# Patient Record
Sex: FEMALE | URBAN - METROPOLITAN AREA
[De-identification: names, ages, dates, MRNs, and addresses within clinical notes are randomized per-mention and may not be internally consistent; named-entity substitution may affect disease eponyms.]

---

## 2017-12-04 ENCOUNTER — OUTPATIENT (OUTPATIENT)
Dept: OUTPATIENT SERVICES | Facility: HOSPITAL | Age: 32
LOS: 1 days | End: 2017-12-04
Payer: COMMERCIAL

## 2017-12-04 DIAGNOSIS — Z01.818 ENCOUNTER FOR OTHER PREPROCEDURAL EXAMINATION: ICD-10-CM

## 2017-12-04 LAB
ALBUMIN SERPL ELPH-MCNC: 3.6 G/DL — SIGNIFICANT CHANGE UP (ref 3.3–5)
ALP SERPL-CCNC: 207 U/L — HIGH (ref 40–120)
ALT FLD-CCNC: 6 U/L — LOW (ref 10–45)
ANION GAP SERPL CALC-SCNC: 13 MMOL/L — SIGNIFICANT CHANGE UP (ref 5–17)
APTT BLD: 28 SEC — SIGNIFICANT CHANGE UP (ref 27.5–37.4)
AST SERPL-CCNC: 14 U/L — SIGNIFICANT CHANGE UP (ref 10–40)
BILIRUB SERPL-MCNC: 0.5 MG/DL — SIGNIFICANT CHANGE UP (ref 0.2–1.2)
BLD GP AB SCN SERPL QL: NEGATIVE — SIGNIFICANT CHANGE UP
BLD GP AB SCN SERPL QL: NEGATIVE — SIGNIFICANT CHANGE UP
BUN SERPL-MCNC: 8 MG/DL — SIGNIFICANT CHANGE UP (ref 7–23)
CALCIUM SERPL-MCNC: 9 MG/DL — SIGNIFICANT CHANGE UP (ref 8.4–10.5)
CHLORIDE SERPL-SCNC: 100 MMOL/L — SIGNIFICANT CHANGE UP (ref 96–108)
CO2 SERPL-SCNC: 23 MMOL/L — SIGNIFICANT CHANGE UP (ref 22–31)
CREAT SERPL-MCNC: 0.58 MG/DL — SIGNIFICANT CHANGE UP (ref 0.5–1.3)
GLUCOSE SERPL-MCNC: 70 MG/DL — SIGNIFICANT CHANGE UP (ref 70–99)
HCT VFR BLD CALC: 31.2 % — LOW (ref 34.5–45)
HGB BLD-MCNC: 10 G/DL — LOW (ref 11.5–15.5)
INR BLD: 0.92 — SIGNIFICANT CHANGE UP (ref 0.88–1.16)
MCHC RBC-ENTMCNC: 27.3 PG — SIGNIFICANT CHANGE UP (ref 27–34)
MCHC RBC-ENTMCNC: 32.1 G/DL — SIGNIFICANT CHANGE UP (ref 32–36)
MCV RBC AUTO: 85.2 FL — SIGNIFICANT CHANGE UP (ref 80–100)
PLATELET # BLD AUTO: 227 K/UL — SIGNIFICANT CHANGE UP (ref 150–400)
POTASSIUM SERPL-MCNC: 4.5 MMOL/L — SIGNIFICANT CHANGE UP (ref 3.5–5.3)
POTASSIUM SERPL-SCNC: 4.5 MMOL/L — SIGNIFICANT CHANGE UP (ref 3.5–5.3)
PROT SERPL-MCNC: 6.3 G/DL — SIGNIFICANT CHANGE UP (ref 6–8.3)
PROTHROM AB SERPL-ACNC: 10.2 SEC — SIGNIFICANT CHANGE UP (ref 9.8–12.7)
RBC # BLD: 3.66 M/UL — LOW (ref 3.8–5.2)
RBC # FLD: 14.2 % — SIGNIFICANT CHANGE UP (ref 10.3–16.9)
RH IG SCN BLD-IMP: POSITIVE — SIGNIFICANT CHANGE UP
RH IG SCN BLD-IMP: POSITIVE — SIGNIFICANT CHANGE UP
SODIUM SERPL-SCNC: 136 MMOL/L — SIGNIFICANT CHANGE UP (ref 135–145)
WBC # BLD: 4.6 K/UL — SIGNIFICANT CHANGE UP (ref 3.8–10.5)
WBC # FLD AUTO: 4.6 K/UL — SIGNIFICANT CHANGE UP (ref 3.8–10.5)

## 2017-12-06 ENCOUNTER — INPATIENT (INPATIENT)
Facility: HOSPITAL | Age: 32
LOS: 3 days | Discharge: HOME | End: 2017-12-10
Attending: OBSTETRICS & GYNECOLOGY | Admitting: OBSTETRICS & GYNECOLOGY
Payer: COMMERCIAL

## 2017-12-06 VITALS — WEIGHT: 165.35 LBS | HEIGHT: 61 IN

## 2017-12-06 DIAGNOSIS — D25.9 LEIOMYOMA OF UTERUS, UNSPECIFIED: ICD-10-CM

## 2017-12-06 DIAGNOSIS — Z34.83 ENCOUNTER FOR SUPERVISION OF OTHER NORMAL PREGNANCY, THIRD TRIMESTER: ICD-10-CM

## 2017-12-06 DIAGNOSIS — Z87.42 PERSONAL HISTORY OF OTHER DISEASES OF THE FEMALE GENITAL TRACT: ICD-10-CM

## 2017-12-06 DIAGNOSIS — Z3A.39 39 WEEKS GESTATION OF PREGNANCY: ICD-10-CM

## 2017-12-06 DIAGNOSIS — K66.0 PERITONEAL ADHESIONS (POSTPROCEDURAL) (POSTINFECTION): ICD-10-CM

## 2017-12-06 DIAGNOSIS — O34.211 MATERNAL CARE FOR LOW TRANSVERSE SCAR FROM PREVIOUS CESAREAN DELIVERY: ICD-10-CM

## 2017-12-06 DIAGNOSIS — O34.593 MATERNAL CARE FOR OTHER ABNORMALITIES OF GRAVID UTERUS, THIRD TRIMESTER: ICD-10-CM

## 2017-12-06 DIAGNOSIS — Z91.040 LATEX ALLERGY STATUS: ICD-10-CM

## 2017-12-06 DIAGNOSIS — O34.13 MATERNAL CARE FOR BENIGN TUMOR OF CORPUS UTERI, THIRD TRIMESTER: ICD-10-CM

## 2017-12-06 LAB — T PALLIDUM AB TITR SER: NEGATIVE — SIGNIFICANT CHANGE UP

## 2017-12-06 RX ORDER — SIMETHICONE 80 MG/1
80 TABLET, CHEWABLE ORAL EVERY 4 HOURS
Qty: 0 | Refills: 0 | Status: DISCONTINUED | OUTPATIENT
Start: 2017-12-06 | End: 2017-12-10

## 2017-12-06 RX ORDER — SODIUM CHLORIDE 9 MG/ML
1000 INJECTION, SOLUTION INTRAVENOUS
Qty: 0 | Refills: 0 | Status: DISCONTINUED | OUTPATIENT
Start: 2017-12-06 | End: 2017-12-06

## 2017-12-06 RX ORDER — HEPARIN SODIUM 5000 [USP'U]/ML
5000 INJECTION INTRAVENOUS; SUBCUTANEOUS EVERY 12 HOURS
Qty: 0 | Refills: 0 | Status: DISCONTINUED | OUTPATIENT
Start: 2017-12-06 | End: 2017-12-10

## 2017-12-06 RX ORDER — METOCLOPRAMIDE HCL 10 MG
10 TABLET ORAL ONCE
Qty: 0 | Refills: 0 | Status: DISCONTINUED | OUTPATIENT
Start: 2017-12-06 | End: 2017-12-10

## 2017-12-06 RX ORDER — ONDANSETRON 8 MG/1
4 TABLET, FILM COATED ORAL EVERY 6 HOURS
Qty: 0 | Refills: 0 | Status: DISCONTINUED | OUTPATIENT
Start: 2017-12-06 | End: 2017-12-10

## 2017-12-06 RX ORDER — OXYCODONE AND ACETAMINOPHEN 5; 325 MG/1; MG/1
1 TABLET ORAL
Qty: 0 | Refills: 0 | Status: DISCONTINUED | OUTPATIENT
Start: 2017-12-06 | End: 2017-12-10

## 2017-12-06 RX ORDER — IBUPROFEN 200 MG
600 TABLET ORAL EVERY 6 HOURS
Qty: 0 | Refills: 0 | Status: DISCONTINUED | OUTPATIENT
Start: 2017-12-06 | End: 2017-12-10

## 2017-12-06 RX ORDER — LANOLIN
1 OINTMENT (GRAM) TOPICAL
Qty: 0 | Refills: 0 | Status: DISCONTINUED | OUTPATIENT
Start: 2017-12-06 | End: 2017-12-10

## 2017-12-06 RX ORDER — DOCUSATE SODIUM 100 MG
100 CAPSULE ORAL
Qty: 0 | Refills: 0 | Status: DISCONTINUED | OUTPATIENT
Start: 2017-12-06 | End: 2017-12-10

## 2017-12-06 RX ORDER — INFLUENZA VIRUS VACCINE 15; 15; 15; 15 UG/.5ML; UG/.5ML; UG/.5ML; UG/.5ML
0.5 SUSPENSION INTRAMUSCULAR ONCE
Qty: 0 | Refills: 0 | Status: COMPLETED | OUTPATIENT
Start: 2017-12-06 | End: 2017-12-06

## 2017-12-06 RX ORDER — GLYCERIN ADULT
1 SUPPOSITORY, RECTAL RECTAL AT BEDTIME
Qty: 0 | Refills: 0 | Status: DISCONTINUED | OUTPATIENT
Start: 2017-12-06 | End: 2017-12-10

## 2017-12-06 RX ORDER — NALOXONE HYDROCHLORIDE 4 MG/.1ML
0.1 SPRAY NASAL
Qty: 0 | Refills: 0 | Status: DISCONTINUED | OUTPATIENT
Start: 2017-12-06 | End: 2017-12-10

## 2017-12-06 RX ORDER — OXYTOCIN 10 UNIT/ML
41.67 VIAL (ML) INJECTION
Qty: 20 | Refills: 0 | Status: DISCONTINUED | OUTPATIENT
Start: 2017-12-06 | End: 2017-12-06

## 2017-12-06 RX ORDER — SODIUM CHLORIDE 9 MG/ML
1000 INJECTION, SOLUTION INTRAVENOUS
Qty: 0 | Refills: 0 | Status: DISCONTINUED | OUTPATIENT
Start: 2017-12-06 | End: 2017-12-10

## 2017-12-06 RX ORDER — SODIUM CHLORIDE 9 MG/ML
1000 INJECTION, SOLUTION INTRAVENOUS ONCE
Qty: 0 | Refills: 0 | Status: COMPLETED | OUTPATIENT
Start: 2017-12-06 | End: 2017-12-06

## 2017-12-06 RX ORDER — DIPHENHYDRAMINE HCL 50 MG
25 CAPSULE ORAL EVERY 6 HOURS
Qty: 0 | Refills: 0 | Status: DISCONTINUED | OUTPATIENT
Start: 2017-12-06 | End: 2017-12-10

## 2017-12-06 RX ORDER — FERROUS SULFATE 325(65) MG
325 TABLET ORAL DAILY
Qty: 0 | Refills: 0 | Status: DISCONTINUED | OUTPATIENT
Start: 2017-12-06 | End: 2017-12-10

## 2017-12-06 RX ORDER — OXYTOCIN 10 UNIT/ML
333.33 VIAL (ML) INJECTION
Qty: 20 | Refills: 0 | Status: COMPLETED | OUTPATIENT
Start: 2017-12-06 | End: 2017-12-06

## 2017-12-06 RX ORDER — OXYCODONE AND ACETAMINOPHEN 5; 325 MG/1; MG/1
2 TABLET ORAL EVERY 6 HOURS
Qty: 0 | Refills: 0 | Status: DISCONTINUED | OUTPATIENT
Start: 2017-12-06 | End: 2017-12-10

## 2017-12-06 RX ORDER — OXYTOCIN 10 UNIT/ML
41.67 VIAL (ML) INJECTION
Qty: 20 | Refills: 0 | Status: DISCONTINUED | OUTPATIENT
Start: 2017-12-06 | End: 2017-12-10

## 2017-12-06 RX ORDER — CEFAZOLIN SODIUM 1 G
2000 VIAL (EA) INJECTION EVERY 8 HOURS
Qty: 0 | Refills: 0 | Status: COMPLETED | OUTPATIENT
Start: 2017-12-06 | End: 2017-12-07

## 2017-12-06 RX ORDER — ACETAMINOPHEN 500 MG
650 TABLET ORAL EVERY 6 HOURS
Qty: 0 | Refills: 0 | Status: DISCONTINUED | OUTPATIENT
Start: 2017-12-06 | End: 2017-12-10

## 2017-12-06 RX ORDER — TETANUS TOXOID, REDUCED DIPHTHERIA TOXOID AND ACELLULAR PERTUSSIS VACCINE, ADSORBED 5; 2.5; 8; 8; 2.5 [IU]/.5ML; [IU]/.5ML; UG/.5ML; UG/.5ML; UG/.5ML
0.5 SUSPENSION INTRAMUSCULAR ONCE
Qty: 0 | Refills: 0 | Status: DISCONTINUED | OUTPATIENT
Start: 2017-12-06 | End: 2017-12-10

## 2017-12-06 RX ORDER — CITRIC ACID/SODIUM CITRATE 300-500 MG
30 SOLUTION, ORAL ORAL ONCE
Qty: 0 | Refills: 0 | Status: COMPLETED | OUTPATIENT
Start: 2017-12-06 | End: 2017-12-06

## 2017-12-06 RX ORDER — CEFAZOLIN SODIUM 1 G
2000 VIAL (EA) INJECTION ONCE
Qty: 0 | Refills: 0 | Status: COMPLETED | OUTPATIENT
Start: 2017-12-06 | End: 2017-12-06

## 2017-12-06 RX ADMIN — Medication 100 MILLIGRAM(S): at 18:49

## 2017-12-06 RX ADMIN — SODIUM CHLORIDE 125 MILLILITER(S): 9 INJECTION, SOLUTION INTRAVENOUS at 18:49

## 2017-12-06 RX ADMIN — Medication 125 MILLIUNIT(S)/MIN: at 14:12

## 2017-12-06 RX ADMIN — SODIUM CHLORIDE 2000 MILLILITER(S): 9 INJECTION, SOLUTION INTRAVENOUS at 08:15

## 2017-12-06 RX ADMIN — Medication 30 MILLILITER(S): at 09:49

## 2017-12-06 RX ADMIN — HEPARIN SODIUM 5000 UNIT(S): 5000 INJECTION INTRAVENOUS; SUBCUTANEOUS at 22:11

## 2017-12-06 RX ADMIN — Medication 1000 MILLIUNIT(S)/MIN: at 14:12

## 2017-12-06 RX ADMIN — Medication 600 MILLIGRAM(S): at 22:41

## 2017-12-06 RX ADMIN — Medication 100 MILLIGRAM(S): at 18:48

## 2017-12-06 RX ADMIN — Medication 100 MILLIGRAM(S): at 09:49

## 2017-12-06 RX ADMIN — SODIUM CHLORIDE 125 MILLILITER(S): 9 INJECTION, SOLUTION INTRAVENOUS at 09:14

## 2017-12-06 RX ADMIN — SIMETHICONE 80 MILLIGRAM(S): 80 TABLET, CHEWABLE ORAL at 22:11

## 2017-12-06 RX ADMIN — Medication 600 MILLIGRAM(S): at 22:11

## 2017-12-06 RX ADMIN — Medication 125 MILLIUNIT(S)/MIN: at 14:13

## 2017-12-07 LAB
HCT VFR BLD CALC: 27.1 % — LOW (ref 34.5–45)
HGB BLD-MCNC: 8.4 G/DL — LOW (ref 11.5–15.5)
MCHC RBC-ENTMCNC: 27.1 PG — SIGNIFICANT CHANGE UP (ref 27–34)
MCHC RBC-ENTMCNC: 31 G/DL — LOW (ref 32–36)
MCV RBC AUTO: 87.4 FL — SIGNIFICANT CHANGE UP (ref 80–100)
PLATELET # BLD AUTO: 223 K/UL — SIGNIFICANT CHANGE UP (ref 150–400)
RBC # BLD: 3.1 M/UL — LOW (ref 3.8–5.2)
RBC # FLD: 14 % — SIGNIFICANT CHANGE UP (ref 10.3–16.9)
WBC # BLD: 9.5 K/UL — SIGNIFICANT CHANGE UP (ref 3.8–10.5)
WBC # FLD AUTO: 9.5 K/UL — SIGNIFICANT CHANGE UP (ref 3.8–10.5)

## 2017-12-07 RX ORDER — TETANUS TOXOID, REDUCED DIPHTHERIA TOXOID AND ACELLULAR PERTUSSIS VACCINE, ADSORBED 5; 2.5; 8; 8; 2.5 [IU]/.5ML; [IU]/.5ML; UG/.5ML; UG/.5ML; UG/.5ML
0.5 SUSPENSION INTRAMUSCULAR ONCE
Qty: 0 | Refills: 0 | Status: COMPLETED | OUTPATIENT
Start: 2017-12-07 | End: 2017-12-09

## 2017-12-07 RX ADMIN — OXYCODONE AND ACETAMINOPHEN 1 TABLET(S): 5; 325 TABLET ORAL at 12:00

## 2017-12-07 RX ADMIN — Medication 600 MILLIGRAM(S): at 04:16

## 2017-12-07 RX ADMIN — SIMETHICONE 80 MILLIGRAM(S): 80 TABLET, CHEWABLE ORAL at 12:26

## 2017-12-07 RX ADMIN — Medication 325 MILLIGRAM(S): at 12:26

## 2017-12-07 RX ADMIN — OXYCODONE AND ACETAMINOPHEN 1 TABLET(S): 5; 325 TABLET ORAL at 12:45

## 2017-12-07 RX ADMIN — Medication 600 MILLIGRAM(S): at 04:50

## 2017-12-07 RX ADMIN — Medication 600 MILLIGRAM(S): at 12:45

## 2017-12-07 RX ADMIN — Medication 600 MILLIGRAM(S): at 12:00

## 2017-12-07 RX ADMIN — Medication 100 MILLIGRAM(S): at 02:02

## 2017-12-07 RX ADMIN — Medication 600 MILLIGRAM(S): at 18:53

## 2017-12-07 RX ADMIN — OXYCODONE AND ACETAMINOPHEN 1 TABLET(S): 5; 325 TABLET ORAL at 16:33

## 2017-12-07 RX ADMIN — OXYCODONE AND ACETAMINOPHEN 1 TABLET(S): 5; 325 TABLET ORAL at 17:00

## 2017-12-07 RX ADMIN — Medication 100 MILLIGRAM(S): at 18:54

## 2017-12-07 RX ADMIN — Medication 100 MILLIGRAM(S): at 05:45

## 2017-12-07 RX ADMIN — Medication 100 MILLIGRAM(S): at 12:04

## 2017-12-07 RX ADMIN — HEPARIN SODIUM 5000 UNIT(S): 5000 INJECTION INTRAVENOUS; SUBCUTANEOUS at 22:50

## 2017-12-07 RX ADMIN — Medication 1 TABLET(S): at 12:01

## 2017-12-07 RX ADMIN — HEPARIN SODIUM 5000 UNIT(S): 5000 INJECTION INTRAVENOUS; SUBCUTANEOUS at 12:04

## 2017-12-07 NOTE — LACTATION INITIAL EVALUATION - LATCH
Did not observe latch as baby had just been formula fed and was sleeping soundly in BS crib at this time

## 2017-12-07 NOTE — LACTATION INITIAL EVALUATION - POTENTIAL FOR
ineffective breastfeeding/engorgement/wound healing/feeding confusion/sore nipples/knowledge deficit

## 2017-12-07 NOTE — LACTATION INITIAL EVALUATION - PRO FEEDING CONCERNS YN OB
yes/Inverted/dimpled R nipple that does not leroy at all with stimulation and mom reports discomfort if she tries to leroy that nipple. L nipple is cracked and sore. Mom states that she is not sure she has a desire to breastfeed.

## 2017-12-07 NOTE — LACTATION INITIAL EVALUATION - NIPPLE ASSESSMENT (RIGHT)
Does not leroy with stimulation or a hand pump/dimpled/inverted/not compressible/small/dry and intact

## 2017-12-07 NOTE — PROGRESS NOTE ADULT - SUBJECTIVE AND OBJECTIVE BOX
POD #1  No complaints.  Afegbilre, VS normal.  + flatus.  Abdomen and wound benign.  Walter draining clear urine, to be removed this am.  REGIS Juarez M.D.

## 2017-12-07 NOTE — LACTATION INITIAL EVALUATION - PRO NIPPLE PREFERENCE OB
preference/Slow flow nipples should be used for any supplementation, including formula, fed to baby via a bottle

## 2017-12-07 NOTE — LACTATION INITIAL EVALUATION - NS LACT CON REASON FOR REQ
pump request/Met Dyad. Pt states "I am not sure if I am going to breastfeed. I struggled with my first child and just formula fed him. I might just formula feed this baby as well". Pt has an inverted/dimpled R nipple that does not leroy with stimulation. She has a hand pump at  and reports that "when I try to pull that nipple out, it hurts". Her L nipple is everted well, but cracked/scabbed at the base of the nipple shaft, probably d/t a shallow latch of baby. Reviewed good skin hygiene and maintenance b/w feeding. Breast shells given as well and encouraged mom to wear the shell for inverted nipples on her R breast b/w feeds. Discussed with pt that it is her choice if she wants to breastfeed or not and only she can make that decision. Baby has been formula fed x3 since birth: 30,20,20 cc's, respectively. Baby born  @ 1035, R C/S,  now, 39.1 wks. x2 voids/x3 poops since birth (breast and formula). Expressible colostrum noted on mom's L breast, no expressible colostrum noted on R side with inverted nipple. Discussed with pt that if she does desire to attempt to BF and since formula has been given, she should start the triple feed plan for effective stimulation and to provide EBM to baby, if pt desires. Also reviewed appropriate supplementation amounts per triple feed plan. BS RN to bring pt a BS pump. Discussed that triple feed plan is a lot of work and if she feels she wants to solely formula feed, then she should not overstimulate her breasts by pumping and bringing in mature milk, as then she would have to wean and may deal with unnecessary engorgement. Pt states "I will try to pump and then I will think about it". Reviewed bf'ing basics; information provided on sore nipple management and paced bottle feeding for supplementation. Many visitors at  at this time and pt appeared very distracted. Slow flow nipples provided to use for any supplementation fed to baby via bottle./inverted/flat nipples/multiparous mom

## 2017-12-07 NOTE — LACTATION INITIAL EVALUATION - PRO FEEDING PLAN INFANT OB
breast and formula  feeding…/Baby has been mostly formula fed since birth. Explained triple feed plan if Mom decides she wants to breastfeed.

## 2017-12-07 NOTE — PROGRESS NOTE ADULT - ASSESSMENT
A/P 32y  s/p c/s, POD #1  ,stable  - f/u AM CBC  Diet: Reg  Pain: OPM  IV fluid: Saline lock  OOB/SCDs/IS  Continue to monitor  Anticipate discharge POD #3 or #4

## 2017-12-07 NOTE — PROGRESS NOTE ADULT - SUBJECTIVE AND OBJECTIVE BOX
Patient evaluated at bedside.   She reports pain is well controlled with OPM  She denies headache, dizziness, chest pain, palpitations, shortness of breathe, nausea, vomiting or heavy vaginal bleeding.  She has not been ambulating  or voiding spontaneously with richey in. She is passing gas, tolerating regular diet and is breastfeeding.    Physical Exam:  Vital Signs Last 24 Hrs  T(C): 36.6 (07 Dec 2017 05:22), Max: 36.8 (06 Dec 2017 11:45)  T(F): 97.9 (07 Dec 2017 05:22), Max: 98.3 (06 Dec 2017 22:12)  HR: 88 (07 Dec 2017 05:22) (64 - 97)  BP: 83/54 (07 Dec 2017 05:22) (82/52 - 109/62)  BP(mean): --  RR: 20 (07 Dec 2017 05:22) (16 - 20)  SpO2: 97% (07 Dec 2017 05:22) (96% - 99%)    GA: NAD, A+0 x 3  CV: RRR  Pulm: CTAB  Breasts: soft, nontender, no palpable masses  Abd: + BS, soft, nontender, nondistended, no rebound or guarding, incision clean, dry and intact, uterus firm at midline, 2 fb below umbilicus  : lochia WNL  Richey: clear urine  Extremities: no swelling or calf tenderness, reflexes +2 bilaterally

## 2017-12-07 NOTE — LACTATION INITIAL EVALUATION - LACTATION INTERVENTIONS
Triple feed plan information provided if pt decides to pursue breastfeeding. At this time she is stating that she may formula feed./initiate skin to skin/stimulate nutritive suck using/initiate hand expression routine/initiate dual electric pump routine

## 2017-12-08 RX ADMIN — OXYCODONE AND ACETAMINOPHEN 1 TABLET(S): 5; 325 TABLET ORAL at 11:40

## 2017-12-08 RX ADMIN — HEPARIN SODIUM 5000 UNIT(S): 5000 INJECTION INTRAVENOUS; SUBCUTANEOUS at 23:14

## 2017-12-08 RX ADMIN — Medication 600 MILLIGRAM(S): at 10:59

## 2017-12-08 RX ADMIN — Medication 100 MILLIGRAM(S): at 10:56

## 2017-12-08 RX ADMIN — Medication 600 MILLIGRAM(S): at 18:16

## 2017-12-08 RX ADMIN — OXYCODONE AND ACETAMINOPHEN 1 TABLET(S): 5; 325 TABLET ORAL at 06:53

## 2017-12-08 RX ADMIN — Medication 600 MILLIGRAM(S): at 00:57

## 2017-12-08 RX ADMIN — Medication 600 MILLIGRAM(S): at 11:40

## 2017-12-08 RX ADMIN — Medication 600 MILLIGRAM(S): at 01:28

## 2017-12-08 RX ADMIN — Medication 1 TABLET(S): at 10:57

## 2017-12-08 RX ADMIN — Medication 100 MILLIGRAM(S): at 17:33

## 2017-12-08 RX ADMIN — Medication 100 MILLIGRAM(S): at 06:21

## 2017-12-08 RX ADMIN — Medication 325 MILLIGRAM(S): at 10:57

## 2017-12-08 RX ADMIN — HEPARIN SODIUM 5000 UNIT(S): 5000 INJECTION INTRAVENOUS; SUBCUTANEOUS at 10:56

## 2017-12-08 RX ADMIN — OXYCODONE AND ACETAMINOPHEN 1 TABLET(S): 5; 325 TABLET ORAL at 10:56

## 2017-12-08 RX ADMIN — OXYCODONE AND ACETAMINOPHEN 1 TABLET(S): 5; 325 TABLET ORAL at 06:21

## 2017-12-08 RX ADMIN — Medication 600 MILLIGRAM(S): at 17:33

## 2017-12-08 RX ADMIN — Medication 600 MILLIGRAM(S): at 23:30

## 2017-12-08 NOTE — PROGRESS NOTE ADULT - SUBJECTIVE AND OBJECTIVE BOX
POD #2  No complaints.  Afebrile, VS stable.  Abdomen and wound benign.  Voiding w/o problem.  Pt. is stable.  Routine p-o care.  REGIS Juarez M.D.

## 2017-12-09 ENCOUNTER — TRANSCRIPTION ENCOUNTER (OUTPATIENT)
Age: 32
End: 2017-12-09

## 2017-12-09 RX ORDER — ACETAMINOPHEN 500 MG
2 TABLET ORAL
Qty: 0 | Refills: 0 | COMMUNITY
Start: 2017-12-09

## 2017-12-09 RX ORDER — IBUPROFEN 200 MG
1 TABLET ORAL
Qty: 0 | Refills: 0 | COMMUNITY
Start: 2017-12-09

## 2017-12-09 RX ORDER — LANOLIN
1 OINTMENT (GRAM) TOPICAL
Qty: 0 | Refills: 0 | COMMUNITY
Start: 2017-12-09

## 2017-12-09 RX ORDER — ACETAMINOPHEN 500 MG
650 TABLET ORAL EVERY 6 HOURS
Qty: 0 | Refills: 0 | Status: DISCONTINUED | OUTPATIENT
Start: 2017-12-09 | End: 2017-12-10

## 2017-12-09 RX ADMIN — Medication 650 MILLIGRAM(S): at 13:20

## 2017-12-09 RX ADMIN — Medication 650 MILLIGRAM(S): at 21:57

## 2017-12-09 RX ADMIN — Medication 600 MILLIGRAM(S): at 05:30

## 2017-12-09 RX ADMIN — Medication 650 MILLIGRAM(S): at 06:25

## 2017-12-09 RX ADMIN — HEPARIN SODIUM 5000 UNIT(S): 5000 INJECTION INTRAVENOUS; SUBCUTANEOUS at 11:43

## 2017-12-09 RX ADMIN — Medication 325 MILLIGRAM(S): at 11:43

## 2017-12-09 RX ADMIN — Medication 100 MILLIGRAM(S): at 06:25

## 2017-12-09 RX ADMIN — Medication 600 MILLIGRAM(S): at 23:37

## 2017-12-09 RX ADMIN — Medication 600 MILLIGRAM(S): at 00:20

## 2017-12-09 RX ADMIN — Medication 650 MILLIGRAM(S): at 12:42

## 2017-12-09 RX ADMIN — Medication 600 MILLIGRAM(S): at 06:27

## 2017-12-09 RX ADMIN — TETANUS TOXOID, REDUCED DIPHTHERIA TOXOID AND ACELLULAR PERTUSSIS VACCINE, ADSORBED 0.5 MILLILITER(S): 5; 2.5; 8; 8; 2.5 SUSPENSION INTRAMUSCULAR at 11:43

## 2017-12-09 RX ADMIN — HEPARIN SODIUM 5000 UNIT(S): 5000 INJECTION INTRAVENOUS; SUBCUTANEOUS at 22:40

## 2017-12-09 RX ADMIN — Medication 600 MILLIGRAM(S): at 16:53

## 2017-12-09 RX ADMIN — Medication 1 TABLET(S): at 11:43

## 2017-12-09 RX ADMIN — Medication 650 MILLIGRAM(S): at 21:00

## 2017-12-09 RX ADMIN — Medication 600 MILLIGRAM(S): at 22:37

## 2017-12-09 RX ADMIN — Medication 600 MILLIGRAM(S): at 17:40

## 2017-12-09 NOTE — PROGRESS NOTE ADULT - SUBJECTIVE AND OBJECTIVE BOX
Patient evaluated at bedside.   She reports pain is well controlled with OPM  She denies headache, dizziness, chest pain, palpitations, shortness of breathe, nausea, vomiting or heavy vaginal bleeding.  She has been ambulating  and voiding spontaneously. She is passing gas, tolerating regular diet and is breastfeeding.    Physical Exam:  Vital Signs Last 24 Hrs  T(C): 36.8 (08 Dec 2017 18:58), Max: 36.9 (08 Dec 2017 06:17)  T(F): 98.3 (08 Dec 2017 18:58), Max: 98.4 (08 Dec 2017 06:17)  HR: 86 (08 Dec 2017 18:58) (86 - 98)  BP: 95/64 (08 Dec 2017 18:58) (91/59 - 95/64)  BP(mean): --  RR: 18 (08 Dec 2017 18:58) (16 - 18)  SpO2: 99% (08 Dec 2017 18:58) (99% - 99%)    GA: NAD, A+0 x 3  CV: RRR  Pulm: CTAB  Breasts: soft, nontender, no palpable masses  Abd: + BS, soft, nontender, nondistended, no rebound or guarding, incision clean, dry and intact, uterus firm at midline, 2 fb below umbilicus  : lochia WNL  Walter: clear urine  Extremities: no swelling or calf tenderness, reflexes +2 bilaterally

## 2017-12-09 NOTE — DISCHARGE NOTE OB - MEDICATION SUMMARY - MEDICATIONS TO TAKE
I will START or STAY ON the medications listed below when I get home from the hospital:    acetaminophen 325 mg oral tablet  -- 2 tab(s) by mouth every 6 hours, As needed, For Temp greater than 38.5 C (101.3 F)  -- Indication: For PREGNANCY    ibuprofen 600 mg oral tablet  -- 1 tab(s) by mouth every 6 hours, As needed, Mild pain or headache  -- Indication: For PREGNANCY    lanolin topical ointment  -- 1 application on skin every 3 hours, As needed, Sore Nipples  -- Indication: For PREGNANCY    Prenatal Multivitamins with Folic Acid 1 mg oral tablet  -- 1 tab(s) by mouth once a day  -- Indication: For PREGNANCY

## 2017-12-09 NOTE — DISCHARGE NOTE OB - CARE PROVIDER_API CALL
Norman Juarez (MD), Obstetrics and Gynecology  [address] [place] [address]  Phone: [phone]  Fax: [phone]

## 2017-12-09 NOTE — DISCHARGE NOTE OB - CARE PLAN
Principal Discharge DX:	39 weeks gestation of pregnancy  Goal:	independent living  Instructions for follow-up, activity and diet:	Appointment with Dr. carrillo in 2 weeks, regular diet, normal activity  Secondary Diagnosis:	Uterine scar from previous  delivery

## 2017-12-09 NOTE — PROGRESS NOTE ADULT - SUBJECTIVE AND OBJECTIVE BOX
POD #3  c/o post-op pain that is being controlled with Tylenol and Motrin  Afebrile, VS stable.  Abdomen and wound benign  Pt. stable.  For d/c home tomorrow am.  REGIS Juarez M.D.

## 2017-12-09 NOTE — DISCHARGE NOTE OB - PATIENT PORTAL LINK FT
“You can access the [employer] Patient Portal, offered by [facility], by registering with the following website: [email or web address]”

## 2017-12-09 NOTE — PROGRESS NOTE ADULT - ASSESSMENT
A/P 32y  s/p c/s, POD #3  ,stable  Diet: Reg  Pain: OPM  IV fluid: Saline lock  OOB/SCDs/IS  Continue to monitor  Anticipate discharge POD #3 or #4

## 2017-12-10 VITALS
RESPIRATION RATE: 18 BRPM | DIASTOLIC BLOOD PRESSURE: 72 MMHG | SYSTOLIC BLOOD PRESSURE: 102 MMHG | TEMPERATURE: 98 F | OXYGEN SATURATION: 100 % | HEART RATE: 87 BPM

## 2017-12-10 RX ADMIN — Medication 650 MILLIGRAM(S): at 10:30

## 2017-12-10 RX ADMIN — Medication 325 MILLIGRAM(S): at 10:02

## 2017-12-10 RX ADMIN — Medication 1 TABLET(S): at 10:02

## 2017-12-10 RX ADMIN — Medication 600 MILLIGRAM(S): at 06:53

## 2017-12-10 RX ADMIN — Medication 100 MILLIGRAM(S): at 06:52

## 2017-12-10 RX ADMIN — Medication 650 MILLIGRAM(S): at 10:02

## 2017-12-10 RX ADMIN — Medication 600 MILLIGRAM(S): at 07:50

## 2017-12-10 NOTE — PROGRESS NOTE ADULT - ASSESSMENT
A/P  32y  s/p , POD #4, stable  - Pain: Motrin or Percocet prn  - GI: Reg diet  - : Voiding  - DVT prophylaxis: SQH 5000u q12  - Dispo: POD4

## 2017-12-10 NOTE — PROGRESS NOTE ADULT - SUBJECTIVE AND OBJECTIVE BOX
POD #4  No complaints except having a hard time having BM.  Afebrile, VS normal  Abdomen benign, wound clean.  Staples to be removed prior to discharge.  Received TDAP yesterday.  Pt. stable and ready for d/c home.   Instructions given.  Chapis HAYWARD M.D.

## 2017-12-10 NOTE — PROGRESS NOTE ADULT - SUBJECTIVE AND OBJECTIVE BOX
Patient evaluated at bedside.   She reports pain is well controlled.  She denies headache, dizziness, chest pain, palpitations, shortness of breathe, nausea, vomiting or heavy vaginal bleeding.  She has been ambulating without assistance, voiding spontaneously, passing gas, tolerating regular diet and is not breastfeeding.    Physical Exam:  Vital Signs Last 24 Hrs  T(C): 37.1 (10 Dec 2017 06:06), Max: 37.1 (10 Dec 2017 06:06)  T(F): 98.8 (10 Dec 2017 06:06), Max: 98.8 (10 Dec 2017 06:06)  HR: 92 (10 Dec 2017 06:06) (70 - 98)  BP: 101/64 (10 Dec 2017 06:06) (101/64 - 111/75)  BP(mean): --  RR: 18 (10 Dec 2017 06:06) (18 - 18)  SpO2: 98% (10 Dec 2017 06:06) (98% - 99%)      GA: NAD, A+0 x 3  CV: RRR  Pulm: Normal work of breathing  Breasts: soft, nontender, no palpable masses  Abd: + BS, soft, nontender, nondistended, no rebound or guarding, uterus firm at midline, at the umbilicus  Incision: well approximated, no erythema or discharge  : lochia WNL  Extremities: no swelling or calf tenderness

## 2017-12-12 LAB — SURGICAL PATHOLOGY STUDY: SIGNIFICANT CHANGE UP

## 2023-05-10 ENCOUNTER — APPOINTMENT (OUTPATIENT)
Dept: OBGYN | Facility: CLINIC | Age: 38
End: 2023-05-10
Payer: COMMERCIAL

## 2023-05-10 VITALS
SYSTOLIC BLOOD PRESSURE: 99 MMHG | OXYGEN SATURATION: 94 % | HEIGHT: 61 IN | HEART RATE: 92 BPM | BODY MASS INDEX: 26.24 KG/M2 | DIASTOLIC BLOOD PRESSURE: 71 MMHG | WEIGHT: 139 LBS

## 2023-05-10 DIAGNOSIS — O36.80X0 PREGNANCY WITH INCONCLUSIVE FETAL VIABILITY, NOT APPLICABLE OR UNSPECIFIED: ICD-10-CM

## 2023-05-10 DIAGNOSIS — L20.9 ATOPIC DERMATITIS, UNSPECIFIED: ICD-10-CM

## 2023-05-10 DIAGNOSIS — Z32.01 ENCOUNTER FOR PREGNANCY TEST, RESULT POSITIVE: ICD-10-CM

## 2023-05-10 PROBLEM — Z00.00 ENCOUNTER FOR PREVENTIVE HEALTH EXAMINATION: Status: ACTIVE | Noted: 2023-05-10

## 2023-05-10 PROCEDURE — 76830 TRANSVAGINAL US NON-OB: CPT

## 2023-05-10 PROCEDURE — 99203 OFFICE O/P NEW LOW 30 MIN: CPT | Mod: 25

## 2023-05-10 PROCEDURE — 36415 COLL VENOUS BLD VENIPUNCTURE: CPT

## 2023-05-10 NOTE — PLAN
[FreeTextEntry1] : Thin EMT, no gestational sac, ISRAEL hypoechoic region, poss nabothian cyst. Discussed DDx with pt: possible miscarriage vs early gestation vs ectopic pregnancy. RTO in 48 hours for repeat bHCG and reassessment of symptoms.

## 2023-05-10 NOTE — PROCEDURE
[Transvaginal OB Sonogram] : Transvaginal OB Sonogram [Intrauterine Pregnancy] : no intrauterine pregnancy [Yolk Sac] : no yolk sac [FreeTextEntry1] : Thin EMT, no gestational sac, ISRAEL hypoechoic region, poss nabothian cyst

## 2023-05-10 NOTE — HISTORY OF PRESENT ILLNESS
[HCG+: ___(Date)] : a positive HCG was recorded on [unfilled] [Currently Active] : currently active [Men] : men [No] : No [Y] : Positive pregnancy history [PGHxTotal] : 4 [[facility] : 2 [PGHxAbortions] : 1 [[facility]] : 2 [PGHxABInduced] : 1 [FreeTextEntry1] : 03/24/2023

## 2023-05-11 LAB
ABO + RH PNL BLD: NORMAL
BASOPHILS # BLD AUTO: 0.02 K/UL
BASOPHILS NFR BLD AUTO: 0.4 %
BLD GP AB SCN SERPL QL: NORMAL
EOSINOPHIL # BLD AUTO: 0.1 K/UL
EOSINOPHIL NFR BLD AUTO: 2.1 %
HCG SERPL-MCNC: 38 MIU/ML
HCT VFR BLD CALC: 41.4 %
HGB BLD-MCNC: 13.1 G/DL
IMM GRANULOCYTES NFR BLD AUTO: 0.2 %
LYMPHOCYTES # BLD AUTO: 1.75 K/UL
LYMPHOCYTES NFR BLD AUTO: 37.4 %
MAN DIFF?: NORMAL
MCHC RBC-ENTMCNC: 30.8 PG
MCHC RBC-ENTMCNC: 31.6 GM/DL
MCV RBC AUTO: 97.2 FL
MONOCYTES # BLD AUTO: 0.33 K/UL
MONOCYTES NFR BLD AUTO: 7.1 %
NEUTROPHILS # BLD AUTO: 2.47 K/UL
NEUTROPHILS NFR BLD AUTO: 52.8 %
PLATELET # BLD AUTO: 309 K/UL
PROGEST SERPL-MCNC: 0.2 NG/ML
RBC # BLD: 4.26 M/UL
RBC # FLD: 12.9 %
WBC # FLD AUTO: 4.68 K/UL

## 2023-05-12 ENCOUNTER — APPOINTMENT (OUTPATIENT)
Dept: OBGYN | Facility: CLINIC | Age: 38
End: 2023-05-12

## 2024-08-15 NOTE — DISCHARGE NOTE OB - NS AS DC FOLLOWUP STROKE INST
Received patient at 1930.  Alert and oriented x 4. Tele Rhythm A.fib, oxygen maintained on room air. Breath sounds -diminished. Skin shows old bruising. Last bowel movement 07/10-according to Pt, provided laxative. Urostomy site clean and intact. Patient reports No chest pain. There is shortness of breath on exertion. Bed is locked and in low position. Call light and personal items within reach. Pt up stand by with walker. Reviewed plan of care and patient verbalizes understanding.         Problem: PAIN - ADULT  Goal: Verbalizes/displays adequate comfort level or patient's stated pain goal  Description: INTERVENTIONS:  - Encourage pt to monitor pain and request assistance  - Assess pain using appropriate pain scale  - Administer analgesics based on type and severity of pain and evaluate response  - Implement non-pharmacological measures as appropriate and evaluate response  - Consider cultural and social influences on pain and pain management  - Manage/alleviate anxiety  - Utilize distraction and/or relaxation techniques  - Monitor for opioid side effects  - Notify MD/LIP if interventions unsuccessful or patient reports new pain  - Anticipate increased pain with activity and pre-medicate as appropriate  Outcome: Progressing     Problem: SAFETY ADULT - FALL  Goal: Free from fall injury  Description: INTERVENTIONS:  - Assess pt frequently for physical needs  - Identify cognitive and physical deficits and behaviors that affect risk of falls.  - Crane fall precautions as indicated by assessment.  - Educate pt/family on patient safety including physical limitations  - Instruct pt to call for assistance with activity based on assessment  - Modify environment to reduce risk of injury  - Provide assistive devices as appropriate  - Consider OT/PT consult to assist with strengthening/mobility  - Encourage toileting schedule  Outcome: Progressing     Problem: Patient/Family Goals  Goal: Patient/Family Long Term  Goal  Description: Patient's Long Term Goal: DC home    Interventions:  - comply with plan of care  - See additional Care Plan goals for specific interventions  Outcome: Progressing  Goal: Patient/Family Short Term Goal  Description: Patient's Short Term Goal: have no bleeding from urostomy    Interventions:   - monitor urostomy output  - See additional Care Plan goals for specific interventions  Outcome: Progressing     Problem: GENITOURINARY - ADULT  Goal: Absence of urinary retention  Description: INTERVENTIONS:  - Assess patient’s ability to void and empty bladder  - Monitor intake/output and perform bladder scan as needed  - Follow urinary retention protocol/standard of care  - Consider collaborating with pharmacy to review patient's medication profile  - Implement strategies to promote bladder emptying  Outcome: Progressing      Influenza vaccination ([facility] Pub Date: August 19, 2014)

## 2024-12-10 NOTE — DISCHARGE NOTE OB - LAUNCH MEDICATION RECONCILIATION
Detail Level: Zone Length Of Therapy: 3 months Add High Risk Medication Management Associated Diagnosis?: No Comments: Patient has one more loading dose injection for week 16 then every 8 weeks. <<-----Click here for Discharge Medication Review